# Patient Record
Sex: MALE | Race: BLACK OR AFRICAN AMERICAN | NOT HISPANIC OR LATINO | URBAN - METROPOLITAN AREA
[De-identification: names, ages, dates, MRNs, and addresses within clinical notes are randomized per-mention and may not be internally consistent; named-entity substitution may affect disease eponyms.]

---

## 2023-10-05 ENCOUNTER — HOSPITAL ENCOUNTER (OUTPATIENT)
Dept: RADIOLOGY | Facility: HOSPITAL | Age: 78
Discharge: HOME | End: 2023-10-05
Payer: COMMERCIAL

## 2023-10-05 DIAGNOSIS — E04.1 NONTOXIC SINGLE THYROID NODULE: ICD-10-CM

## 2023-10-05 PROCEDURE — 70490 CT SOFT TISSUE NECK W/O DYE: CPT

## 2023-10-05 PROCEDURE — 70490 CT SOFT TISSUE NECK W/O DYE: CPT | Performed by: RADIOLOGY

## 2023-11-14 ENCOUNTER — APPOINTMENT (OUTPATIENT)
Dept: SURGERY | Facility: CLINIC | Age: 78
End: 2023-11-14
Payer: COMMERCIAL

## 2023-11-14 NOTE — PROGRESS NOTES
History Of Present Illness  Jag Herrera is a 78 y.o. male presenting with ***.     Past Medical History  No past medical history on file.    Surgical History  No past surgical history on file.     Social History  He has no history on file for tobacco use, alcohol use, and drug use.    Family History  No family history on file.     Allergies  Patient has no allergy information on record.    Review of Systems     Physical Exam     Last Recorded Vitals  There were no vitals taken for this visit.    Relevant Results  {If you would like to pull in Medications, type .meds     If you would like to pull in Lab results for the last 24 hours, type .[id number]    If you would like to pull in Imaging results, type .imgrslt :99}      ***     Assessment/Plan   {Assess/PlanSmartLinks:26401}    ***       I spent *** minutes in the professional and overall care of this patient.      Len Correa MD

## 2023-11-29 ENCOUNTER — APPOINTMENT (OUTPATIENT)
Dept: SURGERY | Facility: CLINIC | Age: 78
End: 2023-11-29
Payer: COMMERCIAL

## 2023-12-04 ENCOUNTER — APPOINTMENT (OUTPATIENT)
Dept: SURGERY | Facility: CLINIC | Age: 78
End: 2023-12-04
Payer: COMMERCIAL

## 2023-12-13 ENCOUNTER — LAB (OUTPATIENT)
Dept: LAB | Facility: LAB | Age: 78
End: 2023-12-13
Payer: COMMERCIAL

## 2023-12-13 ENCOUNTER — OFFICE VISIT (OUTPATIENT)
Dept: SURGERY | Facility: CLINIC | Age: 78
End: 2023-12-13
Payer: COMMERCIAL

## 2023-12-13 VITALS — WEIGHT: 180.2 LBS | BODY MASS INDEX: 27.6 KG/M2

## 2023-12-13 DIAGNOSIS — E04.0 GOITER DIFFUSE, NONTOXIC: Primary | ICD-10-CM

## 2023-12-13 DIAGNOSIS — E04.0 GOITER DIFFUSE, NONTOXIC: ICD-10-CM

## 2023-12-13 LAB
T4 FREE SERPL-MCNC: 0.94 NG/DL (ref 0.78–1.48)
TSH SERPL-ACNC: 0.45 MIU/L (ref 0.44–3.98)

## 2023-12-13 PROCEDURE — 99214 OFFICE O/P EST MOD 30 MIN: CPT | Performed by: SURGERY

## 2023-12-13 PROCEDURE — 84443 ASSAY THYROID STIM HORMONE: CPT

## 2023-12-13 PROCEDURE — 1125F AMNT PAIN NOTED PAIN PRSNT: CPT | Performed by: SURGERY

## 2023-12-13 PROCEDURE — 84439 ASSAY OF FREE THYROXINE: CPT

## 2023-12-13 PROCEDURE — 36415 COLL VENOUS BLD VENIPUNCTURE: CPT

## 2023-12-13 PROCEDURE — 1159F MED LIST DOCD IN RCRD: CPT | Performed by: SURGERY

## 2023-12-13 RX ORDER — ASPIRIN 81 MG/1
1 TABLET ORAL DAILY
COMMUNITY
Start: 2018-02-27

## 2023-12-13 RX ORDER — PRAVASTATIN SODIUM 40 MG/1
TABLET ORAL
COMMUNITY
Start: 2016-11-11

## 2023-12-13 RX ORDER — LISINOPRIL 20 MG/1
TABLET ORAL
COMMUNITY
Start: 2021-07-14

## 2023-12-13 RX ORDER — ERGOCALCIFEROL 1.25 MG/1
CAPSULE ORAL
COMMUNITY
Start: 2021-07-14

## 2023-12-13 RX ORDER — BISACODYL 10 MG/1
SUPPOSITORY RECTAL
COMMUNITY
Start: 2018-02-27

## 2023-12-13 RX ORDER — AMLODIPINE BESYLATE 5 MG/1
TABLET ORAL
COMMUNITY
Start: 2021-07-14

## 2023-12-13 RX ORDER — DOCUSATE SODIUM 100 MG/1
CAPSULE, LIQUID FILLED ORAL 2 TIMES DAILY
COMMUNITY
Start: 2018-02-27

## 2023-12-13 RX ORDER — METFORMIN HYDROCHLORIDE 500 MG/1
TABLET ORAL
COMMUNITY
Start: 2017-02-27

## 2023-12-13 RX ORDER — ACETAMINOPHEN 325 MG/1
TABLET ORAL
COMMUNITY
Start: 2018-02-27

## 2023-12-13 RX ORDER — NITROGLYCERIN 0.4 MG/1
TABLET SUBLINGUAL
COMMUNITY
Start: 2018-02-27

## 2023-12-13 ASSESSMENT — PAIN SCALES - GENERAL: PAINLEVEL: 6

## 2023-12-13 NOTE — PROGRESS NOTES
Jag Herrera is a 78 y.o. male on day 0 of admission presenting with No Principal Problem: There is no principal problem currently on the Problem List. Please update the Problem List and refresh..    Assessment/Plan   Patient with a large thyroid goiter predominantly on the right side and then extending into the thoracic inlet significantly.  We have ordered an ultrasound to better assess for malignant lesions within the goiter.  Basic thyroid panel was also sent off.  I would refer her to downtown surgical oncology or ENT for stella regarding need for surgery.  This is not something we would manage here in the community hospital setting.  Furthermore I do not see that he has a established primary doctor.  No family with him today.  Nobody listed as emergency contact other than a friend.    Subjective   Patient has no complaints today.  Following up after recent CT scan       Objective     Physical Exam  NAD  Alert but not particularly oriented  Non icteric  Unchanged right neck mass consistent with goiter.  Previous scar consistent with tracheostomy  CTA  RR  Abdomen soft min tender. Wounds clean, intact  Extremities warm, well perfused     Last Recorded Vitals  Weight 81.7 kg (180 lb 3.2 oz).  Intake/Output last 3 Shifts:  No intake/output data recorded.    Relevant Results    Scheduled medications    Continuous medications    PRN medications      No results found for this or any previous visit (from the past 24 hour(s)).    CT soft tissue neck wo IV contrast  Status: Final result     PACS Images     Show images for CT soft tissue neck wo IV contrast  Signed by    Signed Time Phone Pager   Mumtaz Feliz MD 10/05/2023 16:03 [phone]      Exam Information    Status Exam Begun Exam Ended   Final 10/05/2023 15:29 10/05/2023 15:43     Study Result    Narrative & Impression   Interpreted By:  Mumtaz Feliz,  Fernando Kaur   STUDY:  CT SOFT TISSUE NECK WO IV CONTRAST;  10/5/2023 3:43 pm       INDICATION:  Signs/Symptoms:NONTOXIC SINGLE THYROID NODULE.      COMPARISON:  None.      ACCESSION NUMBER(S):  [id number]      ORDERING CLINICIAN:  MORE HARDY      TECHNIQUE:  Axial CT images of the neck were obtained.  No IV contrast was given.  The images were reformatted in angled axial, coronal and sagittal  planes.      FINDINGS:  Oral Cavity, Pharynx and Larynx: There is mild displacement of the  hypopharyngeal structures towards the right due to large thyroid  mass. Otherwise, the nasopharyngeal and oropharyngeal structures are  unremarkable. The hypopharyngeal and laryngeal structures are  unremarkable.      Retropharyngeal and Prevertebral Soft Tissues: Unremarkable.      Lymph nodes: There are few non specific bilateral neck nodes,  probably reactive in etiology. There is no lymphadenopathy by CT size  criteria.      Neck vessels: Evaluation of the neck vessels is limited due to  noncontrast evaluation. However, there is posterolateral displacement  of the cervical vasculature by large thyroid mass. Otherwise, the  vasculature appears unremarkable.      Thyroid gland:      Heterogeneous markedly enlarged bilateral thyroid lobes with the  right thyroid mass measuring up to 9.0 x 6.4 x 5.7 cm which extends  superiorly to the level of the angle of the mandible and displaces  the submandibular gland and sternocleidomastoid musculature laterally  and anteriorly. Additionally, as mentioned above, there is  posterolateral displacement of the cervical vasculature.      The left thyroid gland is markedly enlarged measuring up to 16.7 x  7.3 x 7.0 cm. The mass extends into the posterior mediastinum,  posterior to the great vessels and displaces the trachea  anterolaterally. The trachea measures up to 1.2 cm in its smallest  width. Additionally, the esophagus is markedly displaced  posterolaterally the mass.      Parotid and submandibular glands: As mentioned above, there is  anterior displacement of the right  submandibular gland. Otherwise,  bilateral parotid and submandibular glands are unremarkable in  appearance.      Paranasal Sinuses and Mastoids: Visualized paranasal sinuses and  bilateral mastoids are clear.      Visualized orbital structures are unremarkable.      Visualized upper lungs are clear.      Mild-to-moderate degenerative changes of the cervical spine are  visualized. No suspicious osseous lesions.      IMPRESSION:  1. Markedly enlarged left-greater-than-right heterogeneous thyroid  lobes measuring up to 16.7 x 7.3 x 7.07 cm on the left and up to 9.0  x 6.4 x 5.7 cm on the right. The left thyroid mass extends inferiorly  into the posterior mediastinum, posterior to the great vessels  causing anterolateral displacement of the trachea and posterolateral  displacement of the esophagus. There is approximate 11 cm inferior  extension through the thoracic inlet.  2. The right thyroid gland causes mass effect within the neck of the  hypopharyngeal structures, cervical vasculature, and musculature as  described above.  3. There is no lymphadenopathy by CT size criteria within limitations  of a noncontrast study       I spent 25 minutes in the professional and overall care of this patient.      Len Correa MD

## 2024-01-12 DIAGNOSIS — E04.9 THYROID GOITER: ICD-10-CM

## 2024-01-18 ENCOUNTER — APPOINTMENT (OUTPATIENT)
Dept: OTOLARYNGOLOGY | Facility: CLINIC | Age: 79
End: 2024-01-18
Payer: COMMERCIAL

## 2024-02-16 PROBLEM — R22.1 LOCALIZED SWELLING, MASS AND LUMP, NECK: Status: ACTIVE | Noted: 2024-02-16

## 2024-02-16 PROBLEM — E04.1 NONTOXIC THYROID NODULE: Status: ACTIVE | Noted: 2024-02-16

## 2024-02-16 PROBLEM — H61.21 IMPACTED CERUMEN OF RIGHT EAR: Status: ACTIVE | Noted: 2023-10-31

## 2024-02-16 PROBLEM — H60.60 CHRONIC NON-INFECTIVE OTITIS EXTERNA: Status: ACTIVE | Noted: 2022-11-22

## 2024-02-20 ENCOUNTER — OFFICE VISIT (OUTPATIENT)
Dept: OTOLARYNGOLOGY | Facility: CLINIC | Age: 79
End: 2024-02-20
Payer: COMMERCIAL

## 2024-02-20 VITALS — WEIGHT: 176 LBS | BODY MASS INDEX: 26.95 KG/M2

## 2024-02-20 DIAGNOSIS — E04.0 GOITER DIFFUSE, NONTOXIC: ICD-10-CM

## 2024-02-20 PROCEDURE — 1125F AMNT PAIN NOTED PAIN PRSNT: CPT | Performed by: GENERAL PRACTICE

## 2024-02-20 PROCEDURE — 4004F PT TOBACCO SCREEN RCVD TLK: CPT | Performed by: GENERAL PRACTICE

## 2024-02-20 PROCEDURE — 1159F MED LIST DOCD IN RCRD: CPT | Performed by: GENERAL PRACTICE

## 2024-02-20 PROCEDURE — 99203 OFFICE O/P NEW LOW 30 MIN: CPT | Performed by: GENERAL PRACTICE

## 2024-02-20 ASSESSMENT — PATIENT HEALTH QUESTIONNAIRE - PHQ9
2. FEELING DOWN, DEPRESSED OR HOPELESS: NOT AT ALL
SUM OF ALL RESPONSES TO PHQ9 QUESTIONS 1 AND 2: 0
1. LITTLE INTEREST OR PLEASURE IN DOING THINGS: NOT AT ALL

## 2024-02-20 NOTE — PROGRESS NOTES
Otolaryngology - Head and Neck Surgery Outpatient New Patient Visit Note        Assessment/Plan   Problem List Items Addressed This Visit    None  Visit Diagnoses         Codes    Goiter diffuse, nontoxic     E04.0          Very large substernal goiter, but pt denies compressive symptoms and has no interest in further eval or possible surgery.   Will contact surgical oncology to assess for potential referral pattern PRN.          Follow up:  -plan for follow up in clinic as needed.    All of the above findings, impressions, treatment planning and follow up plans were discussed with the patient who indicated understanding.  the patient was instructed to contact or return to clinic sooner if symptoms/signs persist or worsen despite the above management.      Gurjit Russo MD  Otolaryngology - Head and Neck Surgery            History Of Present Illness  Jag Herrera is a 78 y.o. male presenting for thyroid goiter.  Pt with a history of dementia and anoxic brain injury.  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     Pt with longstanding history of neck fullness R>L, though unsure of change over time.   Pt has prior history of trach which he reports was decades ago due to PNA.   Denies ongoing/recent dyspnea, dysphonia, dysphagia.    No discomfort or TTP of neck mass.      Recent CT neck 9/26/23 shows large right thyroid goiter and left thyroid extending substernal/retrocardiac.  Significant tracheal displacement.        TSH 0.45, T4 0.94      Past Medical History  He has no past medical history on file.    Surgical History  He has no past surgical history on file.     Social History  He reports that he has been smoking cigarettes. He has never used smokeless tobacco. No history on file for alcohol use and drug use.    Family History  No family history on file.     Allergies  Tuberculin, purified protein derivative    Review of Systems  ROS: Pertinent positives as noted in HPI.    - CONSTITUTIONAL: Does not report  weight loss, fever or chills.    - HEENT:   Ear: Does not report tinnitus, vertigo, hearing loss, otalgia, otorrhea  Nose: Does not report congestion, rhinorrhea, epistaxis, decreased smell  Throat: Does not report pain, dysphagia, odynophagia  Larynx: Does not report hoarseness,  difficulty breathing, pain with speaking (odynophonia)  Neck: Does not report new masses, pain, swelling  Face: Does not report sinus pain, pressure, swelling, numbness, weakness     - RESPIRATORY: Does not report SOB or cough.    - CV: Does not report palpitations or chest pain.     - GI: Does not report abdominal pain, nausea, vomiting or diarrhea.    - : Does not report dysuria or urinary frequency.    - MSK: Does not report myalgia or joint pain.    - SKIN: Does not report rash or pruritus.    - NEUROLOGICAL: Does not report headache or syncope.    - PSYCHIATRIC: Does not report recent changes in mood. Does not report anxiety or depression.         Physical Exam:     GENERAL:   Alert & Oriented to person, place and time; Normal affect and appearance. Well developed and well nourished. Conversant & cooperative with examination.     HEAD:   Normocephalic, atraumatic. No sinus tenderness to palpation. Normal parotid bilaterally. Normal facial strength.     NEUROLOGIC:   Cranial nerves II-XII grossly intact, gait WNL. Normal mood and affect.    EYES:   Extraocular movements intact. Pupils equal, round, reactive to light and accommodation. No nystagmus, no ptosis. no scleral injection.    EAR:   Normal auricle. No discomfort or TTP with manipulation.   Handheld otoscopic exam showed normal external auditory canals bilaterally. No purulence or EAC inflammation. Minimal cerumen.   Right tympanic membrane clear and mobile without evidence of perforation, retraction or middle ear effusion.   Left tympanic membrane clear and mobile without evidence of perforation, retraction or middle ear effusion.     NOSE:   No external deformity. No  external nasal lesions, lacerations, or scars. Nasal tip symmetrical with normal nasal valves.   Nasal cavity with essentially midline septum, normal mucosa and turbinates. No lesions, masses, purulence or polyps.     OC/OP:   Mucous membranes moist, no masses, lesions or exudates.   Normal tongue, floor of mouth, teeth, gums, lips. Normal posterior pharyngeal wall.    Normal tonsils without erythema, exudate or obvious calculi     NECK:   Large R thyroid goiter.  Trachea midline. No tenderness to palpation    LYMPHATIC:   No cervical lymphadenopathy.     RESPIRATORY:   Symmetric chest elevation & no retractions. No significant hoarseness. No increased work of breathing.    CV:   No clubbing or cyanosis. No obvious edema    Skin:   No facial rashes, vesicles or lesions.     Extremities:   No gross abnormalities      Clinic Procedure        Information review:  External sources (notes, imaging, lab results) listed below personally reviewed to aid in medical decision making process.  -  -  -

## 2024-12-31 ENCOUNTER — OFFICE VISIT (OUTPATIENT)
Dept: UROLOGY | Facility: HOSPITAL | Age: 79
End: 2024-12-31
Payer: COMMERCIAL

## 2024-12-31 ENCOUNTER — LAB (OUTPATIENT)
Dept: LAB | Facility: HOSPITAL | Age: 79
End: 2024-12-31
Payer: COMMERCIAL

## 2024-12-31 VITALS
RESPIRATION RATE: 20 BRPM | SYSTOLIC BLOOD PRESSURE: 146 MMHG | DIASTOLIC BLOOD PRESSURE: 67 MMHG | BODY MASS INDEX: 28.19 KG/M2 | HEART RATE: 74 BPM | TEMPERATURE: 97.9 F | WEIGHT: 184.08 LBS | OXYGEN SATURATION: 98 %

## 2024-12-31 DIAGNOSIS — C61 PROSTATE CANCER (MULTI): ICD-10-CM

## 2024-12-31 LAB — PSA SERPL-MCNC: 25.32 NG/ML

## 2024-12-31 PROCEDURE — 99214 OFFICE O/P EST MOD 30 MIN: CPT | Mod: 25 | Performed by: STUDENT IN AN ORGANIZED HEALTH CARE EDUCATION/TRAINING PROGRAM

## 2024-12-31 PROCEDURE — 36415 COLL VENOUS BLD VENIPUNCTURE: CPT

## 2024-12-31 PROCEDURE — 84402 ASSAY OF FREE TESTOSTERONE: CPT

## 2024-12-31 PROCEDURE — 84153 ASSAY OF PSA TOTAL: CPT

## 2024-12-31 PROCEDURE — 99204 OFFICE O/P NEW MOD 45 MIN: CPT | Performed by: STUDENT IN AN ORGANIZED HEALTH CARE EDUCATION/TRAINING PROGRAM

## 2024-12-31 PROCEDURE — 4004F PT TOBACCO SCREEN RCVD TLK: CPT | Performed by: STUDENT IN AN ORGANIZED HEALTH CARE EDUCATION/TRAINING PROGRAM

## 2024-12-31 PROCEDURE — 1126F AMNT PAIN NOTED NONE PRSNT: CPT | Performed by: STUDENT IN AN ORGANIZED HEALTH CARE EDUCATION/TRAINING PROGRAM

## 2024-12-31 ASSESSMENT — PAIN SCALES - GENERAL: PAINLEVEL_OUTOF10: 0-NO PAIN

## 2024-12-31 NOTE — PROGRESS NOTES
Subjective    Jag Herrera is a 79 y.o. male with elevated PSA referred by Dr. Emmanuel.    He reports feeling well. Denies hematuria and dysuria. Last PSA in 2022 was 2.21. Last testosterone in 2022 was <60.    Prostate Cancer - limited records - local refused local therapy on ADT alone.    Limited records 2005, PCA Gl 4+3=7, iPSA 11.2 , refused local therapy including surgery and radiation.     He was seeing Dr. Burns 2 years ago and got lost to follow up.    He lives in a nursing home and is accompanied by a nurse.     PMHx:  has no past medical history on file.      PSHx:  has no past surgical history on file.      Social:   Tobacco Use: High Risk (12/31/2024)    Patient History     Smoking Tobacco Use: Some Days     Smokeless Tobacco Use: Never     Passive Exposure: Not on file         Family: Cancer-related family history is not on file.        Review of Systems    All systems were reviewed. Anything negative was noted in the HPI.    Objective   Physical Exam  Gen: No acute distress      Psych: Alert and oriented x3      Neuro:  Normal ROM     Resp: Nonlabored respirations      CV: Regular rate and rhythm      Abd: S, NT, ND.     : Deferred     Skin: Warm, dry and intact without rashes      Lymphatics: No peripheral edema           No past medical history on file.      No past surgical history on file.      Assessment & Plan  Prostate cancer (Multi)  He was lost to follow-up.  He had no local therapy since 2005 and only intermittent ADT.  We have no assessment of his disease now.  I will re-refer him back to med onc.  We will check his PSA and T levels.  He is uninterested in brief conversations for surgical therapy nor would I recommend that as local therapy at his age if remains localized.    PLAN:  Repeat PSA and testosterone.   Referral to Dr. Burns  Follow up as needed.     Orders:    Prostate Specific Antigen; Future    Testosterone,Free and Total; Future    Referral To Hematology and Oncology;  Future                               Scribe Attestation  By signing my name below, I, Lloyd Rolle   attest that this documentation has been prepared under the direction and in the presence of Fadi Ragland MD MPH

## 2024-12-31 NOTE — ASSESSMENT & PLAN NOTE
He was lost to follow-up.  He had no local therapy since 2005 and only intermittent ADT.  We have no assessment of his disease now.  I will re-refer him back to med onc.  We will check his PSA and T levels.  He is uninterested in brief conversations for surgical therapy nor would I recommend that as local therapy at his age if remains localized.    PLAN:  Repeat PSA and testosterone.   Referral to Dr. Burns  Follow up as needed.     Orders:    Prostate Specific Antigen; Future    Testosterone,Free and Total; Future    Referral To Hematology and Oncology; Future

## 2025-01-08 ENCOUNTER — PATIENT OUTREACH (OUTPATIENT)
Dept: HEMATOLOGY/ONCOLOGY | Facility: HOSPITAL | Age: 80
End: 2025-01-08
Payer: COMMERCIAL

## 2025-01-08 LAB
TESTOSTERONE FREE (CHAN): 20.3 PG/ML (ref 30–135)
TESTOSTERONE,TOTAL,LC-MS/MS: 186 NG/DL (ref 250–1100)

## 2025-01-08 NOTE — PROGRESS NOTES
Referred to medical oncology by Dr. Ragland.   Patient was previously seen by Dr. Burns in 2022 and was then lost to follow up.   12/31/24: PSA= 25.32, testosterone is currently pending.   Last ADT (Eligard 45 mg) was September 2022.  Esther Miller RN 01/08/25 11:49 AM

## 2025-01-08 NOTE — PROGRESS NOTES
Patient is scheduled for a New patient visit with Dr. Kemal Burns at 11 am on January 10, 2025  at the [facility] [place] location.    First navigation outgoing call placed today, 1/8/2025, to patient. RN left patient a voicemail message.  As RN navigator I introduced myself, and  instructed to arrive 15 minutes early for intake. Office location, address and phone number given to patient for [facility] and RN encouraged patient to reach out to the office prior to first visit. Questions answered to pt's satisfaction.   Esther Miller RN 01/08/25 12:15 PM

## 2025-01-09 NOTE — PROGRESS NOTES
Oncology New Patient Visit  Patient ID: Jag Herrera is a 79 y.o. male who presents today to [facility] care.  Referring Physician: Fadi Ragland MD MPH  [id number]  [place] [address]  Primary Care Provider: MD Fadi Motta  PCP - Chip Mendoza / Mikael Sanchez   Care Lobo Cardona  / [phone]  Facility [facility] [id number]    Emergency Contact - cousin - Lobo Cardona -   [phone]     Cancer History  Prostate Cancer - limited records - local refused local therapy on ADT alone  Treatment  -limited records 2005, PCA Gl 4+3=7, iPSA 11.2 , refused local therapy including surgery and radiation  -ADT alone  -unclear responses and unclear as limited PSA seen in records, PSA 7/21 16 2/2022 3.10  -Last ADT lupron 45 mg dose 6 m 2/2022    Other contributing history  Goiter / seen by ENT needs referral to surg onc  cad, vit d def, htn, dm, hl, dementia, tracheostomy, anoxic, brain damage dementia,etoh dep, [facility]      HPI  Referred by provider/s above.  Oncology history is summarized above.  The patient is accompanied by an aide who lives in the facility.  The patient does not have a power of  or a family member that I could get in touch with he is overall doing fairly well he denies any major new symptoms.  Denies any ongoing issues with nausea, vomiting, fevers, chills denies any worsening issues with bone pain or tenderness appetite and energy is otherwise well.    ROS:  10-pt ROS reviewed and negative except as mentioned above.    Medications: below was reviewed and is accurate  Current Outpatient Medications   Medication Instructions    acetaminophen (Tylenol) 325 mg tablet oral    amLODIPine (Norvasc) 5 mg tablet     aspirin 81 mg EC tablet 1 tablet, oral, Daily    bisacodyl (Dulcolax) 10 mg suppository rectal, Daily RT    docusate sodium (Colace) 100 mg capsule oral, 2 times daily    ergocalciferol  (Vitamin D-2) 1.25 MG (54348 UT) capsule     lisinopril 20 mg tablet     metFORMIN (Glucophage) 500 mg tablet     nitroglycerin (Nitrostat) 0.4 mg SL tablet sublingual    pravastatin (Pravachol) 40 mg tablet         Past Medical History  No past medical history on file.  FamilyHistory  No family history on file.   Past Surgical History  No past surgical history on file.  Social History    He  has no history on file for alcohol use.  He  has no history on file for drug use.    Physical exam:  There were no vitals filed for this visit.      GEN: NAD, well-appearing  SKIN: no concerning new lesions examined in upper / lower extremity   LUNGS: CTA  CV: RRR no clear R/G  ABD: Soft, NTND. No rebound or guarding.  EXT:  trace edema bilaterally   NEURO: Grossly intact. No focal deficits.  PSYCH: Normal mood and affect  Some mass/fullness appreciated in the right neck    Radiology review  Reviewed in detail personally and for detail see results in EPIC        Genomics Data    Laboratory review  Reviewed in detail personally and for detail see results in [facility]  Lab Results   Component Value Date    WBC 6.2 12/27/2022    HGB 12.4 (L) 12/27/2022    HCT 37.5 (L) 12/27/2022    MCV 87 12/27/2022     12/27/2022     Lab Results   Component Value Date    GLUCOSE 86 12/27/2022    CALCIUM 9.6 12/27/2022     12/27/2022    K 4.2 12/27/2022    CO2 30 12/27/2022     12/27/2022    BUN 10 12/27/2022    CREATININE 0.72 12/27/2022     Lab Results   Component Value Date    PSA 25.32 (H) 12/31/2024    PSA 2.21 12/27/2022    PSA 2.99 09/12/2022     Lab Results   Component Value Date    ALT 10 12/27/2022    AST 16 12/27/2022    ALKPHOS 64 12/27/2022    BILITOT 0.5 12/27/2022     Lab Results   Component Value Date    TESTOSTERONE <60 (L) 12/27/2022       T 186 12/24      ASSESSMENT AND PLAN   We had a long discussion regarding the natural history, prognosis, and potential treatment options for his disease.  We discussed timing of  therapy and next line standard options as well as clinical trial options for his current disease state. Discussed also NCCN guidlines as per the patients diagnosis and treatment options.  The Total Visit includes the following :  face to face time during the visit today if in person, phone / virtual time with the patient,  review of records, including office notes, pathology, radiology, labs, and prior treatments and care coordination. This review directly influenced my assessment and recommendations for this visit.    Prostate Cancer -the patient has been lost to follow-up never had any local treatment and now has progressive rise in testosterone and progressive rise in PSA.  At this point discussed in detail with no other signs or symptoms concerning for metastatic disease he was agreeable to restart back on the ADT.  Will go ahead and arrange for him to get his ADT and we will get a PET scan to further stage him.  Try to get in touch with the nursing facility and unable to get in touch with any family members to discuss goals of care for now he is agreeable to start there and we will contact him once we get follow-up and arrange follow-up after PET scan.  Thyroid mass -seems like it has been an ongoing issue seeing ENT in the past never had follow-up with surgical oncology referral placed.        Kemal Burns MD  Senior Attending Physician/  in Medicine [employer]  [facility]  Patient line [phone]  Fax [phone]

## 2025-01-10 ENCOUNTER — OFFICE VISIT (OUTPATIENT)
Dept: HEMATOLOGY/ONCOLOGY | Facility: HOSPITAL | Age: 80
End: 2025-01-10
Payer: COMMERCIAL

## 2025-01-10 VITALS
TEMPERATURE: 97.5 F | OXYGEN SATURATION: 99 % | BODY MASS INDEX: 27.8 KG/M2 | HEART RATE: 69 BPM | DIASTOLIC BLOOD PRESSURE: 72 MMHG | HEIGHT: 68 IN | SYSTOLIC BLOOD PRESSURE: 138 MMHG | RESPIRATION RATE: 20 BRPM | WEIGHT: 183.42 LBS

## 2025-01-10 DIAGNOSIS — C61 PROSTATE CANCER (MULTI): Primary | ICD-10-CM

## 2025-01-10 PROCEDURE — 99214 OFFICE O/P EST MOD 30 MIN: CPT | Performed by: INTERNAL MEDICINE

## 2025-01-10 PROCEDURE — 1126F AMNT PAIN NOTED NONE PRSNT: CPT | Performed by: INTERNAL MEDICINE

## 2025-01-10 RX ORDER — ALBUTEROL SULFATE 0.83 MG/ML
3 SOLUTION RESPIRATORY (INHALATION) AS NEEDED
OUTPATIENT
Start: 2025-01-20

## 2025-01-10 RX ORDER — EPINEPHRINE 0.3 MG/.3ML
0.3 INJECTION SUBCUTANEOUS EVERY 5 MIN PRN
OUTPATIENT
Start: 2025-01-20

## 2025-01-10 RX ORDER — FAMOTIDINE 10 MG/ML
20 INJECTION INTRAVENOUS ONCE AS NEEDED
OUTPATIENT
Start: 2025-01-20

## 2025-01-10 RX ORDER — DIPHENHYDRAMINE HYDROCHLORIDE 50 MG/ML
50 INJECTION INTRAMUSCULAR; INTRAVENOUS AS NEEDED
OUTPATIENT
Start: 2025-01-20

## 2025-01-10 ASSESSMENT — PAIN SCALES - GENERAL: PAINLEVEL_OUTOF10: 0-NO PAIN

## 2025-01-20 ENCOUNTER — INFUSION (OUTPATIENT)
Dept: HEMATOLOGY/ONCOLOGY | Facility: HOSPITAL | Age: 80
End: 2025-01-20
Payer: COMMERCIAL

## 2025-01-20 ENCOUNTER — HOSPITAL ENCOUNTER (OUTPATIENT)
Dept: RADIOLOGY | Facility: HOSPITAL | Age: 80
Discharge: HOME | End: 2025-01-20
Payer: COMMERCIAL

## 2025-01-20 VITALS
BODY MASS INDEX: 27.57 KG/M2 | SYSTOLIC BLOOD PRESSURE: 145 MMHG | RESPIRATION RATE: 16 BRPM | WEIGHT: 182.54 LBS | OXYGEN SATURATION: 95 % | HEART RATE: 64 BPM | DIASTOLIC BLOOD PRESSURE: 66 MMHG | TEMPERATURE: 97.5 F

## 2025-01-20 DIAGNOSIS — C61 PROSTATE CANCER (MULTI): ICD-10-CM

## 2025-01-20 PROCEDURE — 3430000001 HC RX 343 DIAGNOSTIC RADIOPHARMACEUTICALS: Mod: TB | Performed by: INTERNAL MEDICINE

## 2025-01-20 PROCEDURE — 2500000004 HC RX 250 GENERAL PHARMACY W/ HCPCS (ALT 636 FOR OP/ED): Mod: JZ,JG,TB | Performed by: INTERNAL MEDICINE

## 2025-01-20 PROCEDURE — 78815 PET IMAGE W/CT SKULL-THIGH: CPT | Mod: PET TUMOR INIT TX STRAT | Performed by: NUCLEAR MEDICINE

## 2025-01-20 PROCEDURE — A9596 HC RX 343 DIAGNOSTIC RADIOPHARMACEUTICALS: HCPCS | Mod: TB | Performed by: INTERNAL MEDICINE

## 2025-01-20 PROCEDURE — 78815 PET IMAGE W/CT SKULL-THIGH: CPT | Mod: PI

## 2025-01-20 PROCEDURE — 96402 CHEMO HORMON ANTINEOPL SQ/IM: CPT

## 2025-01-20 RX ORDER — ALBUTEROL SULFATE 0.83 MG/ML
3 SOLUTION RESPIRATORY (INHALATION) AS NEEDED
Status: DISCONTINUED | OUTPATIENT
Start: 2025-01-20 | End: 2025-01-20 | Stop reason: HOSPADM

## 2025-01-20 RX ORDER — EPINEPHRINE 0.3 MG/.3ML
0.3 INJECTION SUBCUTANEOUS EVERY 5 MIN PRN
OUTPATIENT
Start: 2025-04-14

## 2025-01-20 RX ORDER — DIPHENHYDRAMINE HYDROCHLORIDE 50 MG/ML
50 INJECTION INTRAMUSCULAR; INTRAVENOUS AS NEEDED
Status: DISCONTINUED | OUTPATIENT
Start: 2025-01-20 | End: 2025-01-20 | Stop reason: HOSPADM

## 2025-01-20 RX ORDER — DIPHENHYDRAMINE HYDROCHLORIDE 50 MG/ML
50 INJECTION INTRAMUSCULAR; INTRAVENOUS AS NEEDED
OUTPATIENT
Start: 2025-04-14

## 2025-01-20 RX ORDER — ALBUTEROL SULFATE 0.83 MG/ML
3 SOLUTION RESPIRATORY (INHALATION) AS NEEDED
OUTPATIENT
Start: 2025-04-14

## 2025-01-20 RX ORDER — FAMOTIDINE 10 MG/ML
20 INJECTION INTRAVENOUS ONCE AS NEEDED
Status: DISCONTINUED | OUTPATIENT
Start: 2025-01-20 | End: 2025-01-20 | Stop reason: HOSPADM

## 2025-01-20 RX ORDER — EPINEPHRINE 0.3 MG/.3ML
0.3 INJECTION SUBCUTANEOUS EVERY 5 MIN PRN
Status: DISCONTINUED | OUTPATIENT
Start: 2025-01-20 | End: 2025-01-20 | Stop reason: HOSPADM

## 2025-01-20 RX ORDER — FAMOTIDINE 10 MG/ML
20 INJECTION INTRAVENOUS ONCE AS NEEDED
OUTPATIENT
Start: 2025-04-14

## 2025-01-20 RX ADMIN — KIT FOR THE PREPARATION OF GALLIUM GA 68 GOZETOTIDE INJECTION 5.8 MILLICURIE: KIT INTRAVENOUS at 09:15

## 2025-01-20 RX ADMIN — LEUPROLIDE ACETATE 22.5 MG: 22.5 INJECTION, SUSPENSION, EXTENDED RELEASE SUBCUTANEOUS at 12:03

## 2025-01-20 ASSESSMENT — PAIN SCALES - GENERAL: PAINLEVEL_OUTOF10: 0-NO PAIN

## 2025-01-20 NOTE — PROGRESS NOTES
Pt present today for leuprolide acetate injection.  Tolerated with no issues.  Pt discharged to home in stable condition.

## 2025-01-30 DIAGNOSIS — E04.9 ENLARGED THYROID: ICD-10-CM

## 2025-01-30 NOTE — PROGRESS NOTES
Oncology  Visit    Primary Care Provider: MD Fadi Motta  PCP - Chip Mendoza / Mikael Sanchez   Care Lobo Cardona  / [phone]  Facility [facility] [phone]    Emergency Contact - cousin -   Lobo Cardona - [phone]     Cancer History  Prostate Cancer - limited records - local refused local therapy on ADT alone  Treatment  -limited records 2005, PCA Gl 4+3=7, iPSA 11.2 , refused local therapy including surgery and radiation  -ADT alone  -unclear responses and unclear as limited PSA seen in records, PSA 7/21 16 2/2022 3.10  -Last ADT lupron 45 mg dose 6 m 2/2022  -Lost to follow-up , PSA rise , restarted back on ADT January 20, 25, Eligard 3m, PET scan only showed local expression in the prostate no evidence of progression in lymph nodes or bones enlarged bilateral thyroid lobes    Other contributing history  Goiter / seen by ENT needs referral to surg onc  cad, vit d def, htn, dm, hl, dementia, tracheostomy, anoxic, brain damage dementia,etoh dep, [facility]      HPI  The patient presents for follow-up  The patient is without any major new complaints after receiving the injection but is a poor historian and denies any other major new symptoms.  Hot flashes are stable.  Denies any issues with nausea, vomiting, fevers, chills, easy bruising or bleeding denies any issues with chest pain or chest tightness appetite and energy as well.    ROS:  10-pt ROS reviewed and negative except as mentioned above.    Medications: below was reviewed and is accurate  Current Outpatient Medications   Medication Instructions    acetaminophen (Tylenol) 325 mg tablet oral    amLODIPine (Norvasc) 5 mg tablet     aspirin 81 mg EC tablet 1 tablet, oral, Daily    bisacodyl (Dulcolax) 10 mg suppository rectal, Daily RT    docusate sodium (Colace) 100 mg capsule oral, 2 times daily    ergocalciferol (Vitamin D-2) 1.25 MG (08239 UT) capsule     lisinopril 20 mg  tablet     metFORMIN (Glucophage) 500 mg tablet     nitroglycerin (Nitrostat) 0.4 mg SL tablet sublingual    pravastatin (Pravachol) 40 mg tablet         Past Medical History / Social / Family / Surgical reviewed and updated      Physical exam:  Vitals:    01/31/25 0959   BP: 133/51   BP Location: Left arm   Patient Position: Sitting   BP Cuff Size: Adult   Pulse: 71   Resp: 20   Temp: 36.6 °C (97.9 °F)   TempSrc: Temporal   SpO2: 99%   Weight: 82.7 kg (182 lb 5.1 oz)         GEN: NAD, well-appearing yet baseline dementia is stable.  SKIN: no concerning new lesions examined in upper / lower extremity   LUNGS: CTA  CV: RRR no clear R/G  ABD: Soft, NTND. No rebound or guarding.  EXT:  trace edema bilaterally   NEURO: Dementia but no focal neurological deficits that I could appreciate  PSYCH: Normal mood and affect  Some mass/fullness appreciated in the right neck    Radiology review  Reviewed in detail personally and for detail see results in EPIC        Genomics Data    Laboratory review  Reviewed in detail personally and for detail see results in EPIC  Lab Results   Component Value Date    WBC 6.2 12/27/2022    HGB 12.4 (L) 12/27/2022    HCT 37.5 (L) 12/27/2022    MCV 87 12/27/2022     12/27/2022     Lab Results   Component Value Date    GLUCOSE 86 12/27/2022    CALCIUM 9.6 12/27/2022     12/27/2022    K 4.2 12/27/2022    CO2 30 12/27/2022     12/27/2022    BUN 10 12/27/2022    CREATININE 0.72 12/27/2022     Lab Results   Component Value Date    PSA 25.32 (H) 12/31/2024    PSA 2.21 12/27/2022    PSA 2.99 09/12/2022     Lab Results   Component Value Date    ALT 10 12/27/2022    AST 16 12/27/2022    ALKPHOS 64 12/27/2022    BILITOT 0.5 12/27/2022     Lab Results   Component Value Date    TESTOSTERONE <60 (L) 12/27/2022       T 186 12/24      ASSESSMENT AND PLAN     Prostate Cancer -PET scan reviewed and only found to have local disease does have follow-up scheduled in April when due for the next  dose of ADT at this point due to his comorbidities and other health issues and with his SNF placement unclear if would be a candidate for local therapy if ADT shows response could continue on ADT alone.   I did try again to get in touch with the facility and the power of /family member both of the phone numbers are hard to get in touch with and did not leave a message as we are unable to I did discuss in detail with the patient he is pretty clear he does not want to proceed with any definitive radiation we will go ahead and arrange a follow-up in April when he is due for his ADT we will get blood work at that time if PSA continues to rise can discuss options of PSA response continue to monitor he is agreeable with the plan moving forward.    discussed need while on ADT  maintain adequate cardiovascular health, exercise, dietary modifications,  bone health with calcium 1000 mg with vitamin D 400-800 international units      Thyroid mass -seems like it has been an ongoing issue seeing ENT in the past never had follow-up with surgical oncology referral planned in March        Kemal Burns MD  Senior Attending Physician/  in Medicine [facility] [employer] [facility] [employer]  [facility]  Patient line [phone]  Fax [phone]

## 2025-01-31 ENCOUNTER — OFFICE VISIT (OUTPATIENT)
Dept: HEMATOLOGY/ONCOLOGY | Facility: HOSPITAL | Age: 80
End: 2025-01-31
Payer: COMMERCIAL

## 2025-01-31 VITALS
RESPIRATION RATE: 20 BRPM | DIASTOLIC BLOOD PRESSURE: 51 MMHG | BODY MASS INDEX: 27.54 KG/M2 | WEIGHT: 182.32 LBS | TEMPERATURE: 97.9 F | HEART RATE: 71 BPM | OXYGEN SATURATION: 99 % | SYSTOLIC BLOOD PRESSURE: 133 MMHG

## 2025-01-31 DIAGNOSIS — C61 PROSTATE CANCER (MULTI): ICD-10-CM

## 2025-01-31 PROCEDURE — 1159F MED LIST DOCD IN RCRD: CPT | Performed by: INTERNAL MEDICINE

## 2025-01-31 PROCEDURE — 99214 OFFICE O/P EST MOD 30 MIN: CPT | Performed by: INTERNAL MEDICINE

## 2025-01-31 PROCEDURE — 1126F AMNT PAIN NOTED NONE PRSNT: CPT | Performed by: INTERNAL MEDICINE

## 2025-01-31 PROCEDURE — 4004F PT TOBACCO SCREEN RCVD TLK: CPT | Performed by: INTERNAL MEDICINE

## 2025-01-31 ASSESSMENT — PAIN SCALES - GENERAL: PAINLEVEL_OUTOF10: 0-NO PAIN

## 2025-02-17 ENCOUNTER — TELEPHONE (OUTPATIENT)
Dept: SURGERY | Facility: CLINIC | Age: 80
End: 2025-02-17
Payer: COMMERCIAL

## 2025-02-17 NOTE — TELEPHONE ENCOUNTER
3 attempts made to contact someone at [facility] who can assist the patient in being scheduled for thyroid US ahead of appointment with Dr. Xiao 3/12/25. All calls having ended with being disconnected or connected to voicemail that is full. Patient's emergency contact, Lobo Cardona, has a phone number that is not taking calls.

## 2025-02-19 ENCOUNTER — TELEPHONE (OUTPATIENT)
Dept: SURGERY | Facility: CLINIC | Age: 80
End: 2025-02-19
Payer: COMMERCIAL

## 2025-02-19 NOTE — TELEPHONE ENCOUNTER
Called [facility]. Spoke to Lisa (assistant director of nursing). Notified her that Jag needs to go for a thyroid US prior to clinic appointment with Dr. Xiao 3/12/25. Order in [facility]. Lisa states that she will call [facility] to arrange scan for patient. Callback number provided.

## 2025-02-25 ENCOUNTER — HOSPITAL ENCOUNTER (OUTPATIENT)
Dept: RADIOLOGY | Facility: HOSPITAL | Age: 80
Discharge: HOME | End: 2025-02-25
Payer: COMMERCIAL

## 2025-02-25 DIAGNOSIS — E04.9 ENLARGED THYROID: ICD-10-CM

## 2025-02-25 PROCEDURE — 76536 US EXAM OF HEAD AND NECK: CPT

## 2025-02-25 PROCEDURE — 76536 US EXAM OF HEAD AND NECK: CPT | Performed by: STUDENT IN AN ORGANIZED HEALTH CARE EDUCATION/TRAINING PROGRAM

## 2025-03-12 ENCOUNTER — APPOINTMENT (OUTPATIENT)
Dept: SURGERY | Facility: CLINIC | Age: 80
End: 2025-03-12
Payer: COMMERCIAL

## 2025-03-12 VITALS
SYSTOLIC BLOOD PRESSURE: 168 MMHG | BODY MASS INDEX: 27.35 KG/M2 | TEMPERATURE: 96.8 F | WEIGHT: 181.1 LBS | DIASTOLIC BLOOD PRESSURE: 76 MMHG | HEART RATE: 75 BPM

## 2025-03-12 DIAGNOSIS — C61 PROSTATE CANCER (MULTI): ICD-10-CM

## 2025-03-12 PROCEDURE — 1126F AMNT PAIN NOTED NONE PRSNT: CPT | Performed by: SURGERY

## 2025-03-12 PROCEDURE — 1160F RVW MEDS BY RX/DR IN RCRD: CPT | Performed by: SURGERY

## 2025-03-12 PROCEDURE — 99205 OFFICE O/P NEW HI 60 MIN: CPT | Performed by: SURGERY

## 2025-03-12 PROCEDURE — 4004F PT TOBACCO SCREEN RCVD TLK: CPT | Performed by: SURGERY

## 2025-03-12 PROCEDURE — 1159F MED LIST DOCD IN RCRD: CPT | Performed by: SURGERY

## 2025-03-12 RX ORDER — CALCIUM CARBONATE 200(500)MG
1 TABLET,CHEWABLE ORAL 2 TIMES DAILY
COMMUNITY

## 2025-03-12 RX ORDER — DONEPEZIL HYDROCHLORIDE 10 MG/1
10 TABLET, FILM COATED ORAL EVERY MORNING
COMMUNITY

## 2025-03-12 ASSESSMENT — ENCOUNTER SYMPTOMS
NECK PAIN: 0
NEUROLOGICAL NEGATIVE: 1
ENDOCRINE NEGATIVE: 1
EYES NEGATIVE: 1
MUSCULOSKELETAL NEGATIVE: 1
CONSTITUTIONAL NEGATIVE: 1
RESPIRATORY NEGATIVE: 1

## 2025-03-12 ASSESSMENT — PAIN SCALES - GENERAL: PAINLEVEL_OUTOF10: 0-NO PAIN

## 2025-03-12 NOTE — LETTER
March 12, 2025     Kemal Burns MD  [address]  [facility] [address]    Patient: Jag Herrera   YOB: 1945   Date of Visit: 3/12/2025       Dear Dr. Kemal Burns MD:    Thank you for referring Jag Herrera to me for evaluation. Below are my notes for this consultation.  If you have questions, please do not hesitate to call me. I look forward to following your patient along with you.       Sincerely,     Marty Xiao MD      CC: No Recipients  ______________________________________________________________________________________    Subjective  Patient ID: Jag Herrera is a 79 y.o. male who presents for second opinion on massive goiter with cervical and substernal extension.    HPI I saw Mr. Herrera in surgery clinic today.  He was referred by his oncologist who treats his prostate cancer.  Patient is a 79-year-old gentleman who has a known history of prostate cancer.  He currently lives in a nursing home.  He is therefore issues with Alzheimer's dementia    Last year in September 2023 he underwent a neck CT for evaluation of a large goiter.  This was ordered by Dr. Correa one of our general surgeons at a [facility].  Patient also had a history of a prior tracheostomy according to notes from Dr. Correa.  CT scan demonstrates a massive thyroid goiter.  This extends up behind the mandible on the right side.  There is significant retropharyngeal extension.  Then on the left side it extends down substernal with a massive substernal component.  This crosses the midline of the spine from left to right.  It goes down below the level of the aortic arch and continues down in behind the trachea well below the level of the pulmonary bifurcation.  Resection of a mass of this nature would require a complete sternotomy.  Lesion looks about the same on his updated January 20, 2025 PET scan with CT code images.  Ultrasound confirms findings.    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  However he has relatively minor tracheal compression.    In February 2024 he met with an ENT about this issue.  At that time the patient had no interest and refused any surgical interventions.    He is an extremely poor historian.  He is not able to give us any timetable of how long he thinks the mass has been present.  He is accompanied by an aide from his nursing home but to be on the information in his chart there is no other history available.  My nurse tried on 5 separate occasions to contact any family members and/or power of  for any further information.  We have gotten no response from anyone on this.    He denies any neck pain.  No pressure.  No difficulty breathing or swallowing.  No other compressive symptoms.    Review of Systems   Constitutional: Negative.    HENT: Negative.     Eyes: Negative.    Respiratory: Negative.     Endocrine: Negative.    Musculoskeletal: Negative.  Negative for neck pain.   Neurological: Negative.        Objective  Physical Exam  Vitals reviewed.   Constitutional:       Comments: Patient is very limited in his ability to provide any history but quite pleasant in the office.  No acute distress.   Eyes:      Comments: No proptosis   Neck:      Vascular: No carotid bruit.      Comments: Massive thyroid goiter on the right side extending up behind the mandible.  I am not able to palpate the inferior extent of the right lobe or the left lobe of the thyroid which is consistent with his CT findings.  Trachea feels relatively midline and the neck.    He does have a midline scar which looks consistent with an old tracheostomy.  Cardiovascular:      Rate and Rhythm: Normal rate and regular rhythm.      Heart sounds: Normal heart sounds.   Pulmonary:      Effort: Pulmonary effort is normal. No respiratory distress.      Breath sounds: Normal breath sounds. No wheezing or rales.   Musculoskeletal:         General: Normal range of  motion.   Skin:     General: Skin is warm.   Neurological:      General: No focal deficit present.      Mental Status: He is alert.      Comments: Limited ability to have a significant conversation.   Psychiatric:         Mood and Affect: Mood normal.         Behavior: Behavior normal.         Narrative & Impression   Interpreted By:  Mumtaz Feliz and Liller Gregory   STUDY:  CT SOFT TISSUE NECK WO IV CONTRAST;  10/5/2023 3:43 pm      INDICATION:  Signs/Symptoms:NONTOXIC SINGLE THYROID NODULE.      COMPARISON:  None.      ACCESSION NUMBER(S):  [id number]      ORDERING CLINICIAN:  MORE HARDY      TECHNIQUE:  Axial CT images of the neck were obtained.  No IV contrast was given.  The images were reformatted in angled axial, coronal and sagittal  planes.      FINDINGS:  Oral Cavity, Pharynx and Larynx: There is mild displacement of the  hypopharyngeal structures towards the right due to large thyroid  mass. Otherwise, the nasopharyngeal and oropharyngeal structures are  unremarkable. The hypopharyngeal and laryngeal structures are  unremarkable.      Retropharyngeal and Prevertebral Soft Tissues: Unremarkable.      Lymph nodes: There are few non specific bilateral neck nodes,  probably reactive in etiology. There is no lymphadenopathy by CT size  criteria.      Neck vessels: Evaluation of the neck vessels is limited due to  noncontrast evaluation. However, there is posterolateral displacement  of the cervical vasculature by large thyroid mass. Otherwise, the  vasculature appears unremarkable.      Thyroid gland:      Heterogeneous markedly enlarged bilateral thyroid lobes with the  right thyroid mass measuring up to 9.0 x 6.4 x 5.7 cm which extends  superiorly to the level of the angle of the mandible and displaces  the submandibular gland and sternocleidomastoid musculature laterally  and anteriorly. Additionally, as mentioned above, there is  posterolateral displacement of the cervical vasculature.      The  left thyroid gland is markedly enlarged measuring up to 16.7 x  7.3 x 7.0 cm. The mass extends into the posterior mediastinum,  posterior to the great vessels and displaces the trachea  anterolaterally. The trachea measures up to 1.2 cm in its smallest  width. Additionally, the esophagus is markedly displaced  posterolaterally the mass.      Parotid and submandibular glands: As mentioned above, there is  anterior displacement of the right submandibular gland. Otherwise,  bilateral parotid and submandibular glands are unremarkable in  appearance.      Paranasal Sinuses and Mastoids: Visualized paranasal sinuses and  bilateral mastoids are clear.      Visualized orbital structures are unremarkable.      Visualized upper lungs are clear.      Mild-to-moderate degenerative changes of the cervical spine are  visualized. No suspicious osseous lesions.      IMPRESSION:  1. Markedly enlarged left-greater-than-right heterogeneous thyroid  lobes measuring up to 16.7 x 7.3 x 7.07 cm on the left and up to 9.0  x 6.4 x 5.7 cm on the right. The left thyroid mass extends inferiorly  into the posterior mediastinum, posterior to the great vessels  causing anterolateral displacement of the trachea and posterolateral  displacement of the esophagus. There is approximate 11 cm inferior  extension through the thoracic inlet.  2. The right thyroid gland causes mass effect within the neck of the  hypopharyngeal structures, cervical vasculature, and musculature as  described above.  3. There is no lymphadenopathy by CT size criteria within limitations  of a noncontrast study           US THYROID;  2/25/2025 10:55 am      INDICATION:  Signs/Symptoms:enlarged thyroid on PET scan.      ,E04.9 Nontoxic goiter, unspecified      COMPARISON:  None.      ACCESSION NUMBER(S):  [id number]      ORDERING CLINICIAN:  BONIFACIO QUAN      TECHNIQUE:  Multiple ultrasonographic images of the thyroid gland were obtained.      FINDINGS:          RIGHT  LOBE:  The right lobe of the thyroid measures 5.7 cm x 5.9 cm x 9.4 cm. The  right lobe of the thyroid is heterogenous in echotexture and  demonstrates multiple nodules.      Within the mid/inferior thyroid lobe there is a nodule with the  following features: Size: 6.6 x 4.1 x 6.2 cm      Composition: Solid or almost completely solid (2)  Echogenicity: Hyperechoic or isoechoic (1)  Shape:  Wider-than-tall (0)  Margin:  Smooth (0)  Echogenic Foci: None or Large comet-tail artifacts (0)      The total score of this nodule is 3 corresponding to a TI-RADS  category  3; (3 points) Mildly suspicious. FNA is recommended if  >2.5cm, Follow up if >1.5cm in 1, 3, and 5 years..      Within the inferior thyroid lobe there is a nodule with the following  features: Size: 2.5 x 1.9 x 2.7 cm      Composition: Solid or almost completely solid (2)  Echogenicity: Hyperechoic or isoechoic (1)  Shape:  Wider-than-tall (0)  Margin:  Smooth (0)  Echogenic Foci: None or Large comet-tail artifacts (0)      The total score of this nodule is 3 corresponding to a TI-RADS  category  3; (3 points) Mildly suspicious. FNA is recommended if  >2.5cm, Follow up if >1.5cm in 1, 3, and 5 years..              LEFT LOBE:  The left lobe of the thyroid measures 3.8 cm x 3.5 cm x 6.6 cm. The  left lobe of the thyroid is heterogenous in echotexture and  demonstrates multiple nodules. Note is made that the mediastinal  portion of the left thyroid lobe is not well evaluated.      Within the inferior thyroid lobe there is a nodule with the following  features: Size: 4.3 x 3.4 x 3.4 cm      Composition: Solid or almost completely solid (2)  Echogenicity: Hyperechoic or isoechoic (1)  Shape: Wider-than-tall (0)  Margin: Ill-defined (0)  Echogenic Foci: None or Large comet-tail artifacts (0)      The total score of this nodule is 3 corresponding to a TI-RADS  category 3; (3 points) Mildly suspicious. FNA is recommended if  >2.5cm, Follow up if >1.5cm in 1, 3, and  5 years..      ISTHMUS:  The isthmus measures approximately 1.1 cm and is homogeneous in  echotexture and without any identifiable nodules.      Within the isthmus of the thyroid there is a nodule with the  following features: Size: 2.7 x 1.4 x 1.2 cm      Composition: Solid or almost completely solid (2)  Echogenicity: Hyperechoic or isoechoic (1)  Shape:  Wider-than-tall (0)  Margin:  Smooth (0)  Echogenic Foci: None or Large comet-tail artifacts (0)      The total score of this nodule is 3 corresponding to a TI-RADS  category  3; (3 points) Mildly suspicious. FNA is recommended if  >2.5cm, Follow up if >1.5cm in 1, 3, and 5 years..      CERVICAL LYMPH NODES:  Morphologically normal lymph nodes within the cervical region.      IMPRESSION:  *Enlarged heterogenous thyroid with partial visualization of the  mediastinal extension of the left thyroid lobe. Several bilateral  TI-RADS 3 nodules, as described above. FNA/biopsy of these nodules is  recommended based on size criteria.    Assessment/Plan   Mr. Herrera has evidence of a massive cervical and substernal goiter.  Despite the size of all of this, he has no compressive symptoms whatsoever.  He had a negative Roverto's maneuver in the office.    Comparing his CT scan from 2023 to his PET scan from 2025 although he still has a massive goiter I do not see any significant growth or change over time and this mass.  All this looks like a benign multinodular goiter.    I would not recommend any biopsies of this lesion.    Surgical removal of this would be a huge undertaking and would certainly necessitate a complete sternotomy given the extent of the mass extending behind the trachea and the aorta down to the mid level of his chest.  The extent of this operation for a patient with his degree of dementia and the fact that he is completely asymptomatic with this mass has a higher risk than benefit ratio.    I told him and the aide accompanying him that I would not  recommend any surgical intervention for this mass.  He does not require any routine imaging.  He can see us on an as-needed basis.         Marty Xiao MD 03/12/25 8:58 AM

## 2025-03-12 NOTE — PROGRESS NOTES
Subjective   Patient ID: Jag Herrera is a 79 y.o. male who presents for second opinion on massive goiter with cervical and substernal extension.    HPI I saw Mr. Herrera in surgery clinic today.  He was referred by his oncologist who treats his prostate cancer.  Patient is a 79-year-old gentleman who has a known history of prostate cancer.  He currently lives in a nursing home.  He is therefore issues with Alzheimer's dementia    Last year in September 2023 he underwent a neck CT for evaluation of a large goiter.  This was ordered by Dr. Correa one of our general surgeons at a [facility].  Patient also had a history of a prior tracheostomy according to notes from Dr. Correa.  CT scan demonstrates a massive thyroid goiter.  This extends up behind the mandible on the right side.  There is significant retropharyngeal extension.  Then on the left side it extends down substernal with a massive substernal component.  This crosses the midline of the spine from left to right.  It goes down below the level of the aortic arch and continues down in behind the trachea well below the level of the pulmonary bifurcation.  Resection of a mass of this nature would require a complete sternotomy.  Lesion looks about the same on his updated January 20, 2025 PET scan with CT code images.  Ultrasound confirms findings.    Patient does have evidence of tracheal deviation from right to left due to mass effect.  However he has relatively minor tracheal compression.    In February 2024 he met with an ENT about this issue.  At that time the patient had no interest and refused any surgical interventions.    He is an extremely poor historian.  He is not able to give us any timetable of how long he thinks the mass has been present.  He is accompanied by an aide from his nursing home but to be on the information in his chart there is no other history available.  My nurse tried on 5 separate occasions to contact any family members and/or  power of  for any further information.  We have gotten no response from anyone on this.    He denies any neck pain.  No pressure.  No difficulty breathing or swallowing.  No other compressive symptoms.    Review of Systems   Constitutional: Negative.    HENT: Negative.     Eyes: Negative.    Respiratory: Negative.     Endocrine: Negative.    Musculoskeletal: Negative.  Negative for neck pain.   Neurological: Negative.        Objective   Physical Exam  Vitals reviewed.   Constitutional:       Comments: Patient is very limited in his ability to provide any history but quite pleasant in the office.  No acute distress.   Eyes:      Comments: No proptosis   Neck:      Vascular: No carotid bruit.      Comments: Massive thyroid goiter on the right side extending up behind the mandible.  I am not able to palpate the inferior extent of the right lobe or the left lobe of the thyroid which is consistent with his CT findings.  Trachea feels relatively midline and the neck.    He does have a midline scar which looks consistent with an old tracheostomy.  Cardiovascular:      Rate and Rhythm: Normal rate and regular rhythm.      Heart sounds: Normal heart sounds.   Pulmonary:      Effort: Pulmonary effort is normal. No respiratory distress.      Breath sounds: Normal breath sounds. No wheezing or rales.   Musculoskeletal:         General: Normal range of motion.   Skin:     General: Skin is warm.   Neurological:      General: No focal deficit present.      Mental Status: He is alert.      Comments: Limited ability to have a significant conversation.   Psychiatric:         Mood and Affect: Mood normal.         Behavior: Behavior normal.         Narrative & Impression   Interpreted By:  Mumtaz Feliz and Liller Gregory   STUDY:  CT SOFT TISSUE NECK WO IV CONTRAST;  10/5/2023 3:43 pm      INDICATION:  Signs/Symptoms:NONTOXIC SINGLE THYROID NODULE.      COMPARISON:  None.      ACCESSION NUMBER(S):  [id number]      ORDERING  CLINICIAN:  MORE HARDY      TECHNIQUE:  Axial CT images of the neck were obtained.  No IV contrast was given.  The images were reformatted in angled axial, coronal and sagittal  planes.      FINDINGS:  Oral Cavity, Pharynx and Larynx: There is mild displacement of the  hypopharyngeal structures towards the right due to large thyroid  mass. Otherwise, the nasopharyngeal and oropharyngeal structures are  unremarkable. The hypopharyngeal and laryngeal structures are  unremarkable.      Retropharyngeal and Prevertebral Soft Tissues: Unremarkable.      Lymph nodes: There are few non specific bilateral neck nodes,  probably reactive in etiology. There is no lymphadenopathy by CT size  criteria.      Neck vessels: Evaluation of the neck vessels is limited due to  noncontrast evaluation. However, there is posterolateral displacement  of the cervical vasculature by large thyroid mass. Otherwise, the  vasculature appears unremarkable.      Thyroid gland:      Heterogeneous markedly enlarged bilateral thyroid lobes with the  right thyroid mass measuring up to 9.0 x 6.4 x 5.7 cm which extends  superiorly to the level of the angle of the mandible and displaces  the submandibular gland and sternocleidomastoid musculature laterally  and anteriorly. Additionally, as mentioned above, there is  posterolateral displacement of the cervical vasculature.      The left thyroid gland is markedly enlarged measuring up to 16.7 x  7.3 x 7.0 cm. The mass extends into the posterior mediastinum,  posterior to the great vessels and displaces the trachea  anterolaterally. The trachea measures up to 1.2 cm in its smallest  width. Additionally, the esophagus is markedly displaced  posterolaterally the mass.      Parotid and submandibular glands: As mentioned above, there is  anterior displacement of the right submandibular gland. Otherwise,  bilateral parotid and submandibular glands are unremarkable in  appearance.      Paranasal Sinuses and  Mastoids: Visualized paranasal sinuses and  bilateral mastoids are clear.      Visualized orbital structures are unremarkable.      Visualized upper lungs are clear.      Mild-to-moderate degenerative changes of the cervical spine are  visualized. No suspicious osseous lesions.      IMPRESSION:  1. Markedly enlarged left-greater-than-right heterogeneous thyroid  lobes measuring up to 16.7 x 7.3 x 7.07 cm on the left and up to 9.0  x 6.4 x 5.7 cm on the right. The left thyroid mass extends inferiorly  into the posterior mediastinum, posterior to the great vessels  causing anterolateral displacement of the trachea and posterolateral  displacement of the esophagus. There is approximate 11 cm inferior  extension through the thoracic inlet.  2. The right thyroid gland causes mass effect within the neck of the  hypopharyngeal structures, cervical vasculature, and musculature as  described above.  3. There is no lymphadenopathy by CT size criteria within limitations  of a noncontrast study           US THYROID;  2/25/2025 10:55 am      INDICATION:  Signs/Symptoms:enlarged thyroid on PET scan.      ,E04.9 Nontoxic goiter, unspecified      COMPARISON:  None.      ACCESSION NUMBER(S):  [id number]      ORDERING CLINICIAN:  BONIFACIO QUAN      TECHNIQUE:  Multiple ultrasonographic images of the thyroid gland were obtained.      FINDINGS:          RIGHT LOBE:  The right lobe of the thyroid measures 5.7 cm x 5.9 cm x 9.4 cm. The  right lobe of the thyroid is heterogenous in echotexture and  demonstrates multiple nodules.      Within the mid/inferior thyroid lobe there is a nodule with the  following features: Size: 6.6 x 4.1 x 6.2 cm      Composition: Solid or almost completely solid (2)  Echogenicity: Hyperechoic or isoechoic (1)  Shape:  Wider-than-tall (0)  Margin:  Smooth (0)  Echogenic Foci: None or Large comet-tail artifacts (0)      The total score of this nodule is 3 corresponding to a TI-RADS  category  3; (3 points)  Mildly suspicious. FNA is recommended if  >2.5cm, Follow up if >1.5cm in 1, 3, and 5 years..      Within the inferior thyroid lobe there is a nodule with the following  features: Size: 2.5 x 1.9 x 2.7 cm      Composition: Solid or almost completely solid (2)  Echogenicity: Hyperechoic or isoechoic (1)  Shape:  Wider-than-tall (0)  Margin:  Smooth (0)  Echogenic Foci: None or Large comet-tail artifacts (0)      The total score of this nodule is 3 corresponding to a TI-RADS  category  3; (3 points) Mildly suspicious. FNA is recommended if  >2.5cm, Follow up if >1.5cm in 1, 3, and 5 years..              LEFT LOBE:  The left lobe of the thyroid measures 3.8 cm x 3.5 cm x 6.6 cm. The  left lobe of the thyroid is heterogenous in echotexture and  demonstrates multiple nodules. Note is made that the mediastinal  portion of the left thyroid lobe is not well evaluated.      Within the inferior thyroid lobe there is a nodule with the following  features: Size: 4.3 x 3.4 x 3.4 cm      Composition: Solid or almost completely solid (2)  Echogenicity: Hyperechoic or isoechoic (1)  Shape: Wider-than-tall (0)  Margin: Ill-defined (0)  Echogenic Foci: None or Large comet-tail artifacts (0)      The total score of this nodule is 3 corresponding to a TI-RADS  category 3; (3 points) Mildly suspicious. FNA is recommended if  >2.5cm, Follow up if >1.5cm in 1, 3, and 5 years..      ISTHMUS:  The isthmus measures approximately 1.1 cm and is homogeneous in  echotexture and without any identifiable nodules.      Within the isthmus of the thyroid there is a nodule with the  following features: Size: 2.7 x 1.4 x 1.2 cm      Composition: Solid or almost completely solid (2)  Echogenicity: Hyperechoic or isoechoic (1)  Shape:  Wider-than-tall (0)  Margin:  Smooth (0)  Echogenic Foci: None or Large comet-tail artifacts (0)      The total score of this nodule is 3 corresponding to a TI-RADS  category  3; (3 points) Mildly suspicious. FNA is  recommended if  >2.5cm, Follow up if >1.5cm in 1, 3, and 5 years..      CERVICAL LYMPH NODES:  Morphologically normal lymph nodes within the cervical region.      IMPRESSION:  *Enlarged heterogenous thyroid with partial visualization of the  mediastinal extension of the left thyroid lobe. Several bilateral  TI-RADS 3 nodules, as described above. FNA/biopsy of these nodules is  recommended based on size criteria.    Assessment/Plan    Mr. Herrera has evidence of a massive cervical and substernal goiter.  Despite the size of all of this, he has no compressive symptoms whatsoever.  He had a negative Chadbourn's maneuver in the office.    Comparing his CT scan from 2023 to his PET scan from 2025 although he still has a massive goiter I do not see any significant growth or change over time and this mass.  All this looks like a benign multinodular goiter.    I would not recommend any biopsies of this lesion.    Surgical removal of this would be a huge undertaking and would certainly necessitate a complete sternotomy given the extent of the mass extending behind the trachea and the aorta down to the mid level of his chest.  The extent of this operation for a patient with his degree of dementia and the fact that he is completely asymptomatic with this mass has a higher risk than benefit ratio.    I told him and the aide accompanying him that I would not recommend any surgical intervention for this mass.  He does not require any routine imaging.  He can see us on an as-needed basis.         Marty Xiao MD 03/12/25 8:58 AM

## 2025-04-14 ENCOUNTER — LAB (OUTPATIENT)
Dept: LAB | Facility: HOSPITAL | Age: 80
End: 2025-04-14
Payer: COMMERCIAL

## 2025-04-14 ENCOUNTER — INFUSION (OUTPATIENT)
Dept: HEMATOLOGY/ONCOLOGY | Facility: HOSPITAL | Age: 80
End: 2025-04-14
Payer: COMMERCIAL

## 2025-04-14 ENCOUNTER — OFFICE VISIT (OUTPATIENT)
Dept: HEMATOLOGY/ONCOLOGY | Facility: HOSPITAL | Age: 80
End: 2025-04-14
Payer: COMMERCIAL

## 2025-04-14 VITALS
WEIGHT: 174.16 LBS | DIASTOLIC BLOOD PRESSURE: 75 MMHG | BODY MASS INDEX: 26.3 KG/M2 | TEMPERATURE: 97.3 F | SYSTOLIC BLOOD PRESSURE: 160 MMHG | HEART RATE: 77 BPM | RESPIRATION RATE: 20 BRPM

## 2025-04-14 DIAGNOSIS — C61 PROSTATE CANCER (MULTI): ICD-10-CM

## 2025-04-14 LAB
ALBUMIN SERPL BCP-MCNC: 4.3 G/DL (ref 3.4–5)
ALP SERPL-CCNC: 63 U/L (ref 33–136)
ALT SERPL W P-5'-P-CCNC: 7 U/L (ref 10–52)
ANION GAP SERPL CALC-SCNC: 11 MMOL/L (ref 10–20)
AST SERPL W P-5'-P-CCNC: 13 U/L (ref 9–39)
BASOPHILS # BLD AUTO: 0.03 X10*3/UL (ref 0–0.1)
BASOPHILS NFR BLD AUTO: 0.6 %
BILIRUB SERPL-MCNC: 0.6 MG/DL (ref 0–1.2)
BUN SERPL-MCNC: 12 MG/DL (ref 6–23)
CALCIUM SERPL-MCNC: 9.4 MG/DL (ref 8.6–10.3)
CHLORIDE SERPL-SCNC: 105 MMOL/L (ref 98–107)
CO2 SERPL-SCNC: 30 MMOL/L (ref 21–32)
CREAT SERPL-MCNC: 0.81 MG/DL (ref 0.5–1.3)
EGFRCR SERPLBLD CKD-EPI 2021: >90 ML/MIN/1.73M*2
EOSINOPHIL # BLD AUTO: 0.12 X10*3/UL (ref 0–0.4)
EOSINOPHIL NFR BLD AUTO: 2.3 %
ERYTHROCYTE [DISTWIDTH] IN BLOOD BY AUTOMATED COUNT: 13.5 % (ref 11.5–14.5)
GLUCOSE SERPL-MCNC: 148 MG/DL (ref 74–99)
HCT VFR BLD AUTO: 40.2 % (ref 41–52)
HGB BLD-MCNC: 13 G/DL (ref 13.5–17.5)
IMM GRANULOCYTES # BLD AUTO: 0.01 X10*3/UL (ref 0–0.5)
IMM GRANULOCYTES NFR BLD AUTO: 0.2 % (ref 0–0.9)
LYMPHOCYTES # BLD AUTO: 2.61 X10*3/UL (ref 0.8–3)
LYMPHOCYTES NFR BLD AUTO: 50 %
MCH RBC QN AUTO: 29.1 PG (ref 26–34)
MCHC RBC AUTO-ENTMCNC: 32.3 G/DL (ref 32–36)
MCV RBC AUTO: 90 FL (ref 80–100)
MONOCYTES # BLD AUTO: 0.36 X10*3/UL (ref 0.05–0.8)
MONOCYTES NFR BLD AUTO: 6.9 %
NEUTROPHILS # BLD AUTO: 2.09 X10*3/UL (ref 1.6–5.5)
NEUTROPHILS NFR BLD AUTO: 40 %
NRBC BLD-RTO: 0 /100 WBCS (ref 0–0)
PLATELET # BLD AUTO: 262 X10*3/UL (ref 150–450)
POTASSIUM SERPL-SCNC: 3.5 MMOL/L (ref 3.5–5.3)
PROT SERPL-MCNC: 7 G/DL (ref 6.4–8.2)
PSA SERPL-MCNC: 2.3 NG/ML
RBC # BLD AUTO: 4.47 X10*6/UL (ref 4.5–5.9)
SODIUM SERPL-SCNC: 142 MMOL/L (ref 136–145)
TESTOST SERPL-MCNC: <30 NG/DL (ref 240–1000)
WBC # BLD AUTO: 5.2 X10*3/UL (ref 4.4–11.3)

## 2025-04-14 PROCEDURE — 80053 COMPREHEN METABOLIC PANEL: CPT

## 2025-04-14 PROCEDURE — 99215 OFFICE O/P EST HI 40 MIN: CPT | Performed by: NURSE PRACTITIONER

## 2025-04-14 PROCEDURE — 1159F MED LIST DOCD IN RCRD: CPT | Performed by: NURSE PRACTITIONER

## 2025-04-14 PROCEDURE — 96402 CHEMO HORMON ANTINEOPL SQ/IM: CPT

## 2025-04-14 PROCEDURE — 1126F AMNT PAIN NOTED NONE PRSNT: CPT | Performed by: NURSE PRACTITIONER

## 2025-04-14 PROCEDURE — 4004F PT TOBACCO SCREEN RCVD TLK: CPT | Performed by: NURSE PRACTITIONER

## 2025-04-14 PROCEDURE — 2500000004 HC RX 250 GENERAL PHARMACY W/ HCPCS (ALT 636 FOR OP/ED): Mod: JZ,TB | Performed by: INTERNAL MEDICINE

## 2025-04-14 PROCEDURE — 36415 COLL VENOUS BLD VENIPUNCTURE: CPT

## 2025-04-14 PROCEDURE — 84403 ASSAY OF TOTAL TESTOSTERONE: CPT

## 2025-04-14 PROCEDURE — 84153 ASSAY OF PSA TOTAL: CPT

## 2025-04-14 PROCEDURE — 85025 COMPLETE CBC W/AUTO DIFF WBC: CPT

## 2025-04-14 PROCEDURE — 99417 PROLNG OP E/M EACH 15 MIN: CPT | Performed by: NURSE PRACTITIONER

## 2025-04-14 RX ORDER — FAMOTIDINE 10 MG/ML
20 INJECTION, SOLUTION INTRAVENOUS ONCE AS NEEDED
OUTPATIENT
Start: 2025-07-07

## 2025-04-14 RX ORDER — EPINEPHRINE 0.3 MG/.3ML
0.3 INJECTION SUBCUTANEOUS EVERY 5 MIN PRN
OUTPATIENT
Start: 2025-07-07

## 2025-04-14 RX ORDER — ALBUTEROL SULFATE 0.83 MG/ML
3 SOLUTION RESPIRATORY (INHALATION) AS NEEDED
OUTPATIENT
Start: 2025-07-07

## 2025-04-14 RX ORDER — POLYETHYLENE GLYCOL 3350 17 G/17G
17 POWDER, FOR SOLUTION ORAL DAILY PRN
COMMUNITY

## 2025-04-14 RX ORDER — BISMUTH SUBSALICYLATE 262 MG
1 TABLET,CHEWABLE ORAL DAILY
COMMUNITY

## 2025-04-14 RX ORDER — DIPHENHYDRAMINE HYDROCHLORIDE 50 MG/ML
50 INJECTION, SOLUTION INTRAMUSCULAR; INTRAVENOUS AS NEEDED
OUTPATIENT
Start: 2025-07-07

## 2025-04-14 RX ADMIN — LEUPROLIDE ACETATE 22.5 MG: 22.5 INJECTION, SUSPENSION, EXTENDED RELEASE SUBCUTANEOUS at 11:50

## 2025-04-14 ASSESSMENT — PAIN SCALES - GENERAL: PAINLEVEL_OUTOF10: 0-NO PAIN

## 2025-04-14 NOTE — PROGRESS NOTES
"Patient ID: Jag Herrera is a 79 y.o. male.  Attending Physician: Dr. Kemal Burns  Cancer Diagnosis: Cancer Staging   No matching staging information was found for the patient.    Current Therapy: ADT (Eligard q12 weeks)  Genetics: Not on File    Subjective      Cancer History:  Oncology History    No history exists.     2005: iPSA 11.2, Prostate cancer Kimberley 4+3=7. From limited records, apparently refused local therapy. ? ADT  7/2021: PSA 16  12/2022: PSA 2.21  3/2023: ADT would have been due, though patient lost to follow up  12/31/2024; Seen by urology and re-referred back  1/10/2025: Seen by Dr. Burns. Restarted on ADT and PET ordered  1/20/2025: PET shows prostate cancer without LN or distant metastases. Documented refusal of radiation and surgery    Interval History:  Mr. Herrera presents in follow up. He says he feels well. Doesn't get hot flashes. Says his energy is good. Nopain. No urinary or bowel symptoms. Eating well and getting around well without any falls per his report. Says he \"had radiation\" on his thyroid recently and spoke with a surgeon. The remainder of his ROS is otherwise negative.    Lisa is the [facility]  [phone]  [facility]    Objective    BSA: 1.95 meters squared  /75   Pulse 77   Temp 36.3 °C (97.3 °F) (Core)   Resp 20   Wt 79 kg (174 lb 2.6 oz)   BMI 26.30 kg/m²     Physical Exam  General: alert, well-dressed in NAD. Speech is fluent and coherent, words clear. Limited insight.  Skin: warm, dry, and pink without cyanosis or nail clubbing. No rash, petechiae, or ecchymoses  HEENT: Normocephalic atraumatic. Sclera white, conjunctiva pink. EOMs intact. Hearing intact to spoken voice. No visible lesions  Respiratory: Chest expansion symmetric. No audible wheeze. Unlabored breathing.  CV: Good color No edema bilaterally.  Psych: engaged, polite, appropriate conversation and eye contact.    Current Medications:    Current Outpatient Medications:     acetaminophen (Tylenol) " 325 mg tablet, Take by mouth., Disp: , Rfl:     amLODIPine (Norvasc) 5 mg tablet, , Disp: , Rfl:     aspirin 81 mg EC tablet, Take 1 tablet (81 mg) by mouth once daily., Disp: , Rfl:     bisacodyl (Dulcolax) 10 mg suppository, Insert into the rectum once daily., Disp: , Rfl:     calcium carbonate (Tums) 200 mg calcium chewable tablet, Chew 1 tablet (500 mg) 2 times a day., Disp: , Rfl:     docusate sodium (Colace) 100 mg capsule, Take by mouth twice a day., Disp: , Rfl:     donepezil (Aricept) 10 mg tablet, Take 1 tablet (10 mg) by mouth once daily in the morning., Disp: , Rfl:     ergocalciferol (Vitamin D-2) 1.25 MG (44019 UT) capsule, , Disp: , Rfl:     lisinopril 20 mg tablet, , Disp: , Rfl:     metFORMIN (Glucophage) 500 mg tablet, , Disp: , Rfl:     nitroglycerin (Nitrostat) 0.4 mg SL tablet, Place under the tongue., Disp: , Rfl:     pravastatin (Pravachol) 40 mg tablet, , Disp: , Rfl:      Most Recent Labs:  Results for orders placed or performed in visit on 04/14/25   Comprehensive Metabolic Panel    Collection Time: 04/14/25  9:39 AM   Result Value Ref Range    Glucose 148 (H) 74 - 99 mg/dL    Sodium 142 136 - 145 mmol/L    Potassium 3.5 3.5 - 5.3 mmol/L    Chloride 105 98 - 107 mmol/L    Bicarbonate 30 21 - 32 mmol/L    Anion Gap 11 10 - 20 mmol/L    Urea Nitrogen 12 6 - 23 mg/dL    Creatinine 0.81 0.50 - 1.30 mg/dL    eGFR >90 >60 mL/min/1.73m*2    Calcium 9.4 8.6 - 10.3 mg/dL    Albumin 4.3 3.4 - 5.0 g/dL    Alkaline Phosphatase 63 33 - 136 U/L    Total Protein 7.0 6.4 - 8.2 g/dL    AST 13 9 - 39 U/L    Bilirubin, Total 0.6 0.0 - 1.2 mg/dL    ALT 7 (L) 10 - 52 U/L   CBC and Auto Differential    Collection Time: 04/14/25  9:39 AM   Result Value Ref Range    WBC 5.2 4.4 - 11.3 x10*3/uL    nRBC 0.0 0.0 - 0.0 /100 WBCs    RBC 4.47 (L) 4.50 - 5.90 x10*6/uL    Hemoglobin 13.0 (L) 13.5 - 17.5 g/dL    Hematocrit 40.2 (L) 41.0 - 52.0 %    MCV 90 80 - 100 fL    MCH 29.1 26.0 - 34.0 pg    MCHC 32.3 32.0 - 36.0 g/dL     RDW 13.5 11.5 - 14.5 %    Platelets 262 150 - 450 x10*3/uL    Neutrophils % 40.0 40.0 - 80.0 %    Immature Granulocytes %, Automated 0.2 0.0 - 0.9 %    Lymphocytes % 50.0 13.0 - 44.0 %    Monocytes % 6.9 2.0 - 10.0 %    Eosinophils % 2.3 0.0 - 6.0 %    Basophils % 0.6 0.0 - 2.0 %    Neutrophils Absolute 2.09 1.60 - 5.50 x10*3/uL    Immature Granulocytes Absolute, Automated 0.01 0.00 - 0.50 x10*3/uL    Lymphocytes Absolute 2.61 0.80 - 3.00 x10*3/uL    Monocytes Absolute 0.36 0.05 - 0.80 x10*3/uL    Eosinophils Absolute 0.12 0.00 - 0.40 x10*3/uL    Basophils Absolute 0.03 0.00 - 0.10 x10*3/uL      Lab Results   Component Value Date    PSA 25.32 (H) 12/31/2024    PSA 2.21 12/27/2022    PSA 2.99 09/12/2022        Performance Status:  ECOG Score: 1- Restricted in physically strenuous activity.  Carries out light duty.  Karnofsky Score: 70 - Cares for self; unable to carry on normal activity or do normal work       Assessment/Plan   Jag Herrera is a 79 y.o. male with prostate cancer who presents in follow up on ADT.    Discussed with him about his current plan. He does not recall declining local therapy options, particularly radiation. When I attempted to discuss with him, he was not able to grasp the concept of prostate radiation, despite his aide and ADON attempting to explain. He does decline surgery and remembers his conversation with Dr. Ragland somewhat. He remembers the shot, and is very agreeable to this. At this point, given his previously documented declination of therapy, I am not able to ensure he is fully able to make this decision. The aide and ADON do confirm he does not have a legal guardian.   His emergency contacts:  Lobo Cardona (cousin)   [phone], neither work   Sherjohnson Howell (daughter, he did not recognize this name) does not have a number listed but they are trying to get a number from the nursing home by the time he comes next   Jag Herrera (brother) [phone] does  "not connect  \"Ms. Berger\" [phone], is a person's VM that does not identify \"Ms. Berger\".    At this point, we discussed continuing therapy as is, and attempted to get a hold of a family member for the patient. I sent a message to ZACKERY and Dr. Burns regarding this. The patient is agreeable to this for now. Last PSA with decline, pending today.    RTC at time of next injection with labs    Total time spent on this encounter was 55 minutes, which included preparation, direct time with patient, documentation, and care coordination on the day of visit.    Anjana Shaffer, MSN, APRN, AGNP-C, AOCNP  Associate Nurse Practitioner  [facility], [facility]           "

## 2025-04-14 NOTE — PROGRESS NOTES
Jag Herrera is a 79 y.o. male who presents in stable condition for leuprolide injection after seeing his provider this morning    Since his last visit, he has been doing well.  Overall, he states his energy level is stable.  His appetite & hydration status has been good.  He reports no complaints.     Patient tolerated left subcutaneous injection to his left buttock well and has been educated with the overall therapy plan. Questions & concerns addressed by her provider during visit. AVS, lab results & future appointment provided. Patient discharged in stable condition.    Reviewed and approved by GERARDO NELSON on 4/14/25 at 12:01 PM.

## 2025-07-09 NOTE — PROGRESS NOTES
Oncology  Visit    Primary Care Provider: MD Fadi Motta  PCP - Chip Mendoza / Mikael Sanchez   Care Lobo Cardona  / [phone]  [phone]  Marty Xiao     Emergency Contact - cousin -   Lobo Cardnoa - [phone]   Lisa EVERETT -  / [place]   Needs legal guardian     Cancer History  Prostate Cancer - limited records - local refused local therapy on ADT alone  Treatment  -limited records 2005, PCA Gl 4+3=7, iPSA 11.2 , refused local therapy including surgery and radiation  -ADT alone  -unclear responses and unclear as limited PSA seen in records, PSA 7/21 16 2/2022 3.10  -ADT lupron 45 mg dose 6 m 2/2022  -Lost to follow-up , PSA rise , restarted back on ADT January 20, 25, Eligard 3m, PET scan only showed local expression in the prostate no evidence of progression in lymph nodes or bones enlarged bilateral thyroid lobes  -Last ADT 4/14/25 3m Laurie     Other contributing history  cad, vit d def, htn, dm, hl, dementia, tracheostomy, anoxic, brain damage dementia,etoh dep, [facility]  , [facility] cervical / substernal goiter / surg onc eval no change and decision to monitor     HPI  The patient presents for follow-up  He is accompanied by an aide from the facility.  He is without any other major new symptoms.  Very clear that he is not wanting any surgery and/or radiation.  Does not want any invasive procedures.  Unclear how much insight he has regarding his diagnosis.  Denies any issues with nausea, vomiting, fevers, chills, easy bruising or bleeding denies any hot flashes or other worsening bone pain or urinary symptoms.    ROS:  10-pt ROS reviewed and negative except as mentioned above.    Medications: below was reviewed and is accurate  Current Outpatient Medications   Medication Instructions    acetaminophen (Tylenol) 325 mg tablet Take by mouth.    amLODIPine (Norvasc) 5 mg tablet     aspirin 81 mg  EC tablet 1 tablet, Daily    bisacodyl (Dulcolax) 10 mg suppository Daily RT    calcium carbonate (Tums) 200 mg calcium chewable tablet 1 tablet, 2 times daily    docusate sodium (Colace) 100 mg capsule 2 times daily    donepezil (ARICEPT) 10 mg, Every morning    ergocalciferol (Vitamin D-2) 1.25 MG (18046 UT) capsule     lisinopril 20 mg tablet     metFORMIN (Glucophage) 500 mg tablet     multivitamin tablet 1 tablet, Daily    nitroglycerin (Nitrostat) 0.4 mg SL tablet Place under the tongue.    polyethylene glycol (GLYCOLAX, MIRALAX) 17 g, Daily PRN    pravastatin (Pravachol) 40 mg tablet         Past Medical History / Social / Family / Surgical reviewed and updated      Physical exam:  Vitals:    07/11/25 0858   BP: 123/67   BP Location: Right arm   Patient Position: Sitting   BP Cuff Size: Adult   Pulse: 63   Resp: 20   Temp: 36 °C (96.8 °F)   TempSrc: Temporal   SpO2: 98%   Weight: 80.2 kg (176 lb 12.9 oz)           GEN: NAD, well-appearing yet baseline dementia is stable.  SKIN: no concerning new lesions examined in upper / lower extremity   LUNGS: CTA  CV: RRR no clear R/G  ABD: Soft, NTND. No rebound or guarding.  EXT:  trace edema bilaterally   NEURO: Dementia but no focal neurological deficits that I could appreciate  PSYCH: Normal mood and affect  Similar mass/fullness appreciated in the right neck    Radiology review  Reviewed in detail personally and for detail see results in EPIC        Genomics Data    Laboratory review  Reviewed in detail personally and for detail see results in [facility]  Lab Results   Component Value Date    WBC 5.4 07/11/2025    HGB 11.9 (L) 07/11/2025    HCT 36.5 (L) 07/11/2025    MCV 90 07/11/2025     07/11/2025     Lab Results   Component Value Date    GLUCOSE 98 07/11/2025    CALCIUM 9.0 07/11/2025     07/11/2025    K 3.6 07/11/2025    CO2 31 07/11/2025     07/11/2025    BUN 9 07/11/2025    CREATININE 0.74 07/11/2025     Lab Results   Component Value Date    PSA  2.30 04/14/2025    PSA 25.32 (H) 12/31/2024    PSA 2.21 12/27/2022     Lab Results   Component Value Date    ALT 7 (L) 07/11/2025    AST 12 07/11/2025    ALKPHOS 55 07/11/2025    BILITOT 0.3 07/11/2025     Lab Results   Component Value Date    TESTOSTERONE <30 (L) 04/14/2025       T 186 12/24      ASSESSMENT AND PLAN     Prostate Cancer -again complex cases were unable to get definitive legal guardian and in prior conversations decision for more ADT alone with no local therapy last ADT was given in April and did have a PSA response.  Again it seems like they have still not clarified guardianship and message left with the facility will try to see if our  can potentially help with the guardianship.  Will continue to monitor within the mitten therapy and based on PSA and testosterone we will call him and hold off on injection today and arrange follow-up with a new oncologist as I am leaving the practice in 3 months and injection at that time he will contact us for any other new symptoms.    Normocytic anemia-slight drop in hemoglobin no clear signs of bleeding we will hold off on extensive workup and arrange follow-up in 3 months      discussed need while on ADT  maintain adequate cardiovascular health, exercise, dietary modifications,  bone health with calcium 1000 mg with vitamin D 400-800 international units      Thyroid mass -seems like it has been an ongoing issue seeing ENT in the past never had follow-up with surgical oncology referral planned in March        Kemal Burns MD  Senior Attending Physician/  in Medicine [employer]  [facility]  Patient line [phone]  Fax [phone]

## 2025-07-11 ENCOUNTER — LAB (OUTPATIENT)
Dept: LAB | Facility: HOSPITAL | Age: 80
End: 2025-07-11
Payer: COMMERCIAL

## 2025-07-11 ENCOUNTER — OFFICE VISIT (OUTPATIENT)
Dept: HEMATOLOGY/ONCOLOGY | Facility: HOSPITAL | Age: 80
End: 2025-07-11
Payer: COMMERCIAL

## 2025-07-11 ENCOUNTER — APPOINTMENT (OUTPATIENT)
Dept: HEMATOLOGY/ONCOLOGY | Facility: HOSPITAL | Age: 80
End: 2025-07-11
Payer: COMMERCIAL

## 2025-07-11 VITALS
DIASTOLIC BLOOD PRESSURE: 67 MMHG | OXYGEN SATURATION: 98 % | WEIGHT: 176.81 LBS | HEART RATE: 63 BPM | BODY MASS INDEX: 26.7 KG/M2 | SYSTOLIC BLOOD PRESSURE: 123 MMHG | TEMPERATURE: 96.8 F | RESPIRATION RATE: 20 BRPM

## 2025-07-11 DIAGNOSIS — C61 PROSTATE CANCER (MULTI): ICD-10-CM

## 2025-07-11 LAB
ALBUMIN SERPL BCP-MCNC: 3.8 G/DL (ref 3.4–5)
ALP SERPL-CCNC: 55 U/L (ref 33–136)
ALT SERPL W P-5'-P-CCNC: 7 U/L (ref 10–52)
ANION GAP SERPL CALC-SCNC: 11 MMOL/L (ref 10–20)
AST SERPL W P-5'-P-CCNC: 12 U/L (ref 9–39)
BASOPHILS # BLD AUTO: 0.02 X10*3/UL (ref 0–0.1)
BASOPHILS NFR BLD AUTO: 0.4 %
BILIRUB SERPL-MCNC: 0.3 MG/DL (ref 0–1.2)
BUN SERPL-MCNC: 9 MG/DL (ref 6–23)
CALCIUM SERPL-MCNC: 9 MG/DL (ref 8.6–10.3)
CHLORIDE SERPL-SCNC: 105 MMOL/L (ref 98–107)
CO2 SERPL-SCNC: 31 MMOL/L (ref 21–32)
CREAT SERPL-MCNC: 0.74 MG/DL (ref 0.5–1.3)
EGFRCR SERPLBLD CKD-EPI 2021: >90 ML/MIN/1.73M*2
EOSINOPHIL # BLD AUTO: 0.11 X10*3/UL (ref 0–0.4)
EOSINOPHIL NFR BLD AUTO: 2.1 %
ERYTHROCYTE [DISTWIDTH] IN BLOOD BY AUTOMATED COUNT: 13.3 % (ref 11.5–14.5)
GLUCOSE SERPL-MCNC: 98 MG/DL (ref 74–99)
HCT VFR BLD AUTO: 36.5 % (ref 41–52)
HGB BLD-MCNC: 11.9 G/DL (ref 13.5–17.5)
IMM GRANULOCYTES # BLD AUTO: 0.01 X10*3/UL (ref 0–0.5)
IMM GRANULOCYTES NFR BLD AUTO: 0.2 % (ref 0–0.9)
LYMPHOCYTES # BLD AUTO: 2.64 X10*3/UL (ref 0.8–3)
LYMPHOCYTES NFR BLD AUTO: 49.3 %
MCH RBC QN AUTO: 29.2 PG (ref 26–34)
MCHC RBC AUTO-ENTMCNC: 32.6 G/DL (ref 32–36)
MCV RBC AUTO: 90 FL (ref 80–100)
MONOCYTES # BLD AUTO: 0.42 X10*3/UL (ref 0.05–0.8)
MONOCYTES NFR BLD AUTO: 7.9 %
NEUTROPHILS # BLD AUTO: 2.15 X10*3/UL (ref 1.6–5.5)
NEUTROPHILS NFR BLD AUTO: 40.1 %
NRBC BLD-RTO: 0 /100 WBCS (ref 0–0)
PLATELET # BLD AUTO: 268 X10*3/UL (ref 150–450)
POTASSIUM SERPL-SCNC: 3.6 MMOL/L (ref 3.5–5.3)
PROT SERPL-MCNC: 6.2 G/DL (ref 6.4–8.2)
PSA SERPL-MCNC: 1.94 NG/ML
RBC # BLD AUTO: 4.07 X10*6/UL (ref 4.5–5.9)
SODIUM SERPL-SCNC: 143 MMOL/L (ref 136–145)
TESTOST SERPL-MCNC: <30 NG/DL (ref 240–1000)
WBC # BLD AUTO: 5.4 X10*3/UL (ref 4.4–11.3)

## 2025-07-11 PROCEDURE — 85025 COMPLETE CBC W/AUTO DIFF WBC: CPT

## 2025-07-11 PROCEDURE — 1126F AMNT PAIN NOTED NONE PRSNT: CPT | Performed by: INTERNAL MEDICINE

## 2025-07-11 PROCEDURE — 36415 COLL VENOUS BLD VENIPUNCTURE: CPT

## 2025-07-11 PROCEDURE — 84075 ASSAY ALKALINE PHOSPHATASE: CPT

## 2025-07-11 PROCEDURE — 99214 OFFICE O/P EST MOD 30 MIN: CPT | Performed by: INTERNAL MEDICINE

## 2025-07-11 PROCEDURE — 4004F PT TOBACCO SCREEN RCVD TLK: CPT | Performed by: INTERNAL MEDICINE

## 2025-07-11 PROCEDURE — 84153 ASSAY OF PSA TOTAL: CPT

## 2025-07-11 PROCEDURE — 84403 ASSAY OF TOTAL TESTOSTERONE: CPT

## 2025-07-11 ASSESSMENT — PAIN SCALES - GENERAL: PAINLEVEL_OUTOF10: 0-NO PAIN

## 2025-07-25 ENCOUNTER — SOCIAL WORK (OUTPATIENT)
Dept: CASE MANAGEMENT | Facility: HOSPITAL | Age: 80
End: 2025-07-25
Payer: COMMERCIAL

## 2025-07-25 NOTE — PROGRESS NOTES
Voicemail left for [facility] SW ([phone]) to discuss guardianship for pt per [facility] inquiry. Sw provided phone number for facility sw to return call. Sw to be available as needed.     Bessy Gaston MSW, LSW  [phone]